# Patient Record
Sex: MALE | Race: WHITE | URBAN - METROPOLITAN AREA
[De-identification: names, ages, dates, MRNs, and addresses within clinical notes are randomized per-mention and may not be internally consistent; named-entity substitution may affect disease eponyms.]

---

## 2021-11-02 ENCOUNTER — OFFICE VISIT (OUTPATIENT)
Dept: URBAN - METROPOLITAN AREA CLINIC 51 | Facility: CLINIC | Age: 51
End: 2021-11-02
Payer: COMMERCIAL

## 2021-11-02 DIAGNOSIS — Z79.84 LONG TERM (CURRENT) USE OF ORAL ANTIDIABETIC DRUGS: ICD-10-CM

## 2021-11-02 DIAGNOSIS — H52.4 PRESBYOPIA: ICD-10-CM

## 2021-11-02 DIAGNOSIS — E11.9 TYPE 2 DIABETES MELLITUS WITHOUT COMPLICATIONS: Primary | ICD-10-CM

## 2021-11-02 PROCEDURE — 92004 COMPRE OPH EXAM NEW PT 1/>: CPT | Performed by: OPTOMETRIST

## 2021-11-02 PROCEDURE — 92134 CPTRZ OPH DX IMG PST SGM RTA: CPT | Performed by: OPTOMETRIST

## 2021-11-02 ASSESSMENT — VISUAL ACUITY
OS: 20/20
OD: 20/20

## 2021-11-02 ASSESSMENT — KERATOMETRY
OS: 43.27
OD: 43.22

## 2021-11-02 ASSESSMENT — INTRAOCULAR PRESSURE
OD: 17
OS: 17

## 2021-11-02 NOTE — IMPRESSION/PLAN
Impression: Type 2 diabetes mellitus without complications: I47.5. Plan: No diabetic retinopathy observed on today's examination. Discussed the importance of annual diabetic eye exams. Monitor annually with MAC OCT, FUNDUS PHOTOS.